# Patient Record
Sex: FEMALE | Race: WHITE
[De-identification: names, ages, dates, MRNs, and addresses within clinical notes are randomized per-mention and may not be internally consistent; named-entity substitution may affect disease eponyms.]

---

## 2019-03-07 ENCOUNTER — HOSPITAL ENCOUNTER (EMERGENCY)
Dept: HOSPITAL 61 - ER | Age: 49
Discharge: HOME | End: 2019-03-07
Payer: COMMERCIAL

## 2019-03-07 VITALS
DIASTOLIC BLOOD PRESSURE: 58 MMHG | DIASTOLIC BLOOD PRESSURE: 58 MMHG | SYSTOLIC BLOOD PRESSURE: 116 MMHG | SYSTOLIC BLOOD PRESSURE: 116 MMHG | SYSTOLIC BLOOD PRESSURE: 116 MMHG | DIASTOLIC BLOOD PRESSURE: 58 MMHG

## 2019-03-07 VITALS — BODY MASS INDEX: 45.99 KG/M2 | WEIGHT: 293 LBS | HEIGHT: 67 IN

## 2019-03-07 DIAGNOSIS — N93.8: ICD-10-CM

## 2019-03-07 DIAGNOSIS — D64.9: Primary | ICD-10-CM

## 2019-03-07 DIAGNOSIS — E86.0: ICD-10-CM

## 2019-03-07 DIAGNOSIS — Z88.8: ICD-10-CM

## 2019-03-07 DIAGNOSIS — J45.909: ICD-10-CM

## 2019-03-07 LAB
ANION GAP SERPL CALC-SCNC: 10 MMOL/L (ref 6–14)
BASOPHILS # BLD AUTO: 0.1 X10^3/UL (ref 0–0.2)
BASOPHILS NFR BLD: 1 % (ref 0–3)
BUN SERPL-MCNC: 16 MG/DL (ref 7–20)
CALCIUM SERPL-MCNC: 9.1 MG/DL (ref 8.5–10.1)
CHLORIDE SERPL-SCNC: 104 MMOL/L (ref 98–107)
CO2 SERPL-SCNC: 26 MMOL/L (ref 21–32)
CREAT SERPL-MCNC: 0.8 MG/DL (ref 0.6–1)
EOSINOPHIL NFR BLD: 0.2 X10^3/UL (ref 0–0.7)
EOSINOPHIL NFR BLD: 2 % (ref 0–3)
ERYTHROCYTE [DISTWIDTH] IN BLOOD BY AUTOMATED COUNT: 15.5 % (ref 11.5–14.5)
GFR SERPLBLD BASED ON 1.73 SQ M-ARVRAT: 75.9 ML/MIN
GLUCOSE SERPL-MCNC: 140 MG/DL (ref 70–99)
HCT VFR BLD CALC: 24.8 % (ref 36–47)
HGB BLD-MCNC: 7.5 G/DL (ref 12–15.5)
LYMPHOCYTES # BLD: 2.6 X10^3/UL (ref 1–4.8)
LYMPHOCYTES NFR BLD AUTO: 26 % (ref 24–48)
MCH RBC QN AUTO: 26 PG (ref 25–35)
MCHC RBC AUTO-ENTMCNC: 30 G/DL (ref 31–37)
MCV RBC AUTO: 84 FL (ref 79–100)
MONO #: 0.5 X10^3/UL (ref 0–1.1)
MONOCYTES NFR BLD: 5 % (ref 0–9)
NEUT #: 6.6 X10^3UL (ref 1.8–7.7)
NEUTROPHILS NFR BLD AUTO: 66 % (ref 31–73)
PLATELET # BLD AUTO: 356 X10^3/UL (ref 140–400)
POTASSIUM SERPL-SCNC: 3.7 MMOL/L (ref 3.5–5.1)
RBC # BLD AUTO: 2.94 X10^6/UL (ref 3.5–5.4)
SODIUM SERPL-SCNC: 140 MMOL/L (ref 136–145)
WBC # BLD AUTO: 9.9 X10^3/UL (ref 4–11)

## 2019-03-07 PROCEDURE — 36415 COLL VENOUS BLD VENIPUNCTURE: CPT

## 2019-03-07 PROCEDURE — 86850 RBC ANTIBODY SCREEN: CPT

## 2019-03-07 PROCEDURE — 86900 BLOOD TYPING SEROLOGIC ABO: CPT

## 2019-03-07 PROCEDURE — 99284 EMERGENCY DEPT VISIT MOD MDM: CPT

## 2019-03-07 PROCEDURE — 80048 BASIC METABOLIC PNL TOTAL CA: CPT

## 2019-03-07 PROCEDURE — 85025 COMPLETE CBC W/AUTO DIFF WBC: CPT

## 2019-03-07 PROCEDURE — 86901 BLOOD TYPING SEROLOGIC RH(D): CPT

## 2019-03-07 PROCEDURE — 96360 HYDRATION IV INFUSION INIT: CPT

## 2019-03-07 PROCEDURE — 81025 URINE PREGNANCY TEST: CPT

## 2019-03-07 NOTE — PHYS DOC
Past Medical History


Past Medical History:  Anemia, Asthma


Past Surgical History:  Cholecystectomy


Alcohol Use:  None


Drug Use:  None





Adult General


Chief Complaint


Chief Complaint:  DIZZY/LIGHT HEADED





HPI


HPI





Patient is a 49  year old female who presents the ER for evaluation. Patient 

with history of dysfunctional uterine bleeding with constantly for the past 60 

days. Reports established history of fibroids. Denies any abdominal pain. 

Patient has been following with her primary care doctor for this is the 

referred to an OB/GYN but has not seen the OB/GYN yet. Patient has had 

difficulty establishing with an OB/GYN secondary to insurance coverage. reports 

generalized fatigue, weakness and some intermittent orthostatic dizziness with 

standing. Has not required a blood transfusion in the past. Just started on 

iron supplements 3 days ago. Denies any GI bleed symptoms. Not On any blood 

thinners.





Review of Systems


Review of Systems





Constitutional: Denies fever or chills []





Respiratory: Denies cough or shortness of breath []


Cardiovascular: Chest pain, no palpitations, no orthopnea.


GI: Denies abdominal pain, nausea, vomiting, bloody stools or diarrhea []


: Denies dysuria or hematuria [].


Musculoskeletal: Denies back pain or joint pain []


Integument: Denies rash or skin lesions []


Neurologic: Denies headache, focal weakness or sensory changes. some mild 

intermittent dizziness []


Endocrine: Denies polyuria or polydipsia []





All other systems were reviewed and found to be within normal limits, except as 

documented in this note.





Current Medications


Current Medications





Current Medications








 Medications


  (Trade)  Dose


 Ordered  Sig/Travon  Start Time


 Stop Time Status Last Admin


Dose Admin


 


 Sodium Chloride  1,000 ml @ 


 1,000 mls/hr  1X  ONCE  3/7/19 14:15


 3/7/19 15:14 DC 3/7/19 14:15


1,000 MLS/HR











Allergies


Allergies





Allergies








Uncoded Allergies Type Severity Reaction Last Updated Verified


 


  horsradish Allergy Severe Anaphylaxis 4/9/14 











Physical Exam


Physical Exam





Constitutional: obese, non-toxic appearing


HENT: Normocephalic, atraumatic, 


Eyes: PERRLA, EOMI, 


Neck: Normal range of motion, no tenderness, supple, no stridor. [] 


Cardiovascular:Heart rate regular rhythm, no murmur []


Lungs & Thorax:  Bilateral breath sounds clear to auscultation []


Abdomen: Bowel sounds normal, soft, no tenderness, no masses, no pulsatile 

masses. [] 


Skin: Warm, dry, no erythema, no rash. [] 


Back: No tenderness, no CVA tenderness. [] 


Extremities: No tenderness,  no edema. [] 


Neurologic: Alert and oriented X 3, no focal deficits noted. []


Psychologic: Affect normal, judgement normal, mood normal. []





Current Patient Data


Vital Signs





 Vital Signs








  Date Time  Temp Pulse Resp B/P (MAP) Pulse Ox O2 Delivery O2 Flow Rate FiO2


 


3/7/19 14:30  94 23  100   


 


3/7/19 13:22 98.6   156/74 (101)  Room Air  





 98.6       








Lab Values





 Laboratory Tests








Test


 3/7/19


12:37 3/7/19


12:51


 


POC Urine HCG, Qualitative


 Hcg negative


(Negative) 





 


White Blood Count


 


 9.9 x10^3/uL


(4.0-11.0)


 


Red Blood Count


 


 2.94 x10^6/uL


(3.50-5.40)  L


 


Hemoglobin


 


 7.5 g/dL


(12.0-15.5)  L


 


Hematocrit


 


 24.8 %


(36.0-47.0)  L


 


Mean Corpuscular Volume


 


 84 fL ()





 


Mean Corpuscular Hemoglobin  26 pg (25-35)  


 


Mean Corpuscular Hemoglobin


Concent 


 30 g/dL


(31-37)  L


 


Red Cell Distribution Width


 


 15.5 %


(11.5-14.5)  H


 


Platelet Count


 


 356 x10^3/uL


(140-400)


 


Neutrophils (%) (Auto)  66 % (31-73)  


 


Lymphocytes (%) (Auto)  26 % (24-48)  


 


Monocytes (%) (Auto)  5 % (0-9)  


 


Eosinophils (%) (Auto)  2 % (0-3)  


 


Basophils (%) (Auto)  1 % (0-3)  


 


Neutrophils # (Auto)


 


 6.6 x10^3uL


(1.8-7.7)


 


Lymphocytes # (Auto)


 


 2.6 x10^3/uL


(1.0-4.8)


 


Monocytes # (Auto)


 


 0.5 x10^3/uL


(0.0-1.1)


 


Eosinophils # (Auto)


 


 0.2 x10^3/uL


(0.0-0.7)


 


Basophils # (Auto)


 


 0.1 x10^3/uL


(0.0-0.2)


 


Sodium Level


 


 140 mmol/L


(136-145)


 


Potassium Level


 


 3.7 mmol/L


(3.5-5.1)


 


Chloride Level


 


 104 mmol/L


()


 


Carbon Dioxide Level


 


 26 mmol/L


(21-32)


 


Anion Gap  10 (6-14)  


 


Blood Urea Nitrogen


 


 16 mg/dL


(7-20)


 


Creatinine


 


 0.8 mg/dL


(0.6-1.0)


 


Estimated GFR


(Cockcroft-Gault) 


 75.9  





 


Glucose Level


 


 140 mg/dL


(70-99)  H


 


Calcium Level


 


 9.1 mg/dL


(8.5-10.1)





 Laboratory Tests


3/7/19 12:51








 Laboratory Tests


3/7/19 12:51














EKG


EKG


[]





Radiology/Procedures


Radiology/Procedures


[]





Course & Med Decision Making


Course & Med Decision Making


Pertinent Labs and Imaging studies reviewed. (See chart for details)





1601: Hg 7.5. Patient somewhat orthostatic, patient given 1 L fluid with 

resolution of orthostatics. Patient feeling better. Patient discussed about 

borderline hemoglobin. Discussed the patient does not indicate transfusion at 

this time but could in the near future. Advised very close follow-up with her 

primary care doctor. Advised her to discuss her hemoglobin level with her 

primary care doctor. Advised her that she would need a repeat hemoglobin check 

in 3-5 days. Very strict ER return precautions given. Patient verbalized 

understanding. All questions answered.





Dragon Disclaimer


Dragon Disclaimer


This electronic medical record was generated, in whole or in part, using a 

voice recognition dictation system.





Departure


Departure


Impression:  


 Primary Impression:  


 Anemia


 Additional Impressions:  


 Dehydration


 Dysfunctional uterine bleeding


Disposition:  01 HOME, SELF-CARE


Condition:  IMPROVED


Referrals:  


JATINDER JACOBSON MD (PCP)


Patient Instructions:  Anemia, Nonspecific-Brief, Uterine Bleeding, 

Dysfunctional, Easy-to-Read





Additional Instructions:  


Thank you for coming to [facility]. Please read the attached 

handouts. Please follow-up with your primary care physician. Return to the ER  

if your symptoms worsen or you have any other concerns. Please drink plenty 

fluids to stay hydrated. She needed a repeat hemoglobin check on Monday. Your 

level was 7.5 today. Please contact your physician to expedite your referrals 

to OB/GYN.





Problem Qualifiers











VENKAT IRVING DO Mar 7, 2019 15:41

## 2019-03-25 ENCOUNTER — HOSPITAL ENCOUNTER (OUTPATIENT)
Dept: HOSPITAL 61 - KCIC US | Age: 49
Discharge: HOME | End: 2019-03-25
Attending: OBSTETRICS & GYNECOLOGY
Payer: COMMERCIAL

## 2019-03-25 DIAGNOSIS — N88.8: Primary | ICD-10-CM

## 2019-03-25 PROCEDURE — 76856 US EXAM PELVIC COMPLETE: CPT

## 2019-03-25 PROCEDURE — 76830 TRANSVAGINAL US NON-OB: CPT

## 2019-03-25 NOTE — KCIC
EXAM: Pelvic sonogram.

 

HISTORY: Menorrhagia.

 

TECHNIQUE: Sonographic imaging of the pelvis was performed.

 

COMPARISON: None.

 

FINDINGS: The uterus measures 11.6 x 5.3 x 6.6 cm. There is a suspected 

subseptate uterus. The endometrial stripe measures 11.4 mm within the 

right aspect of the endometrial cavity and 14.9 mm within the left aspect 

of the endometrial cavity.

 

The ovaries are not seen, likely obscured due to patient body habitus and 

bowel gas. There is no free fluid. There is an intrauterine fibroid 

measuring 2.1 cm in maximum dimension. There is a nabothian cyst within 

the cervix.

 

IMPRESSION: 

1. Suspected small uterine fibroid.

2. Suspected subseptate uterus. The endometrial stripe is within normal 

limits for the reported premenopausal status of the patient.

2. Obscured ovaries.

 

Electronically signed by: Shweta Guillaume MD (3/25/2019 3:58 PM) [id number]

## 2019-04-19 ENCOUNTER — HOSPITAL ENCOUNTER (OUTPATIENT)
Dept: HOSPITAL 61 - SURGPAT | Age: 49
Discharge: HOME | End: 2019-04-19
Attending: OBSTETRICS & GYNECOLOGY
Payer: COMMERCIAL

## 2019-04-19 DIAGNOSIS — N81.4: ICD-10-CM

## 2019-04-19 DIAGNOSIS — Z01.818: Primary | ICD-10-CM

## 2019-04-19 LAB
BASOPHILS # BLD AUTO: 0.1 X10^3/UL (ref 0–0.2)
BASOPHILS NFR BLD: 1 % (ref 0–3)
EOSINOPHIL NFR BLD: 0.4 X10^3/UL (ref 0–0.7)
EOSINOPHIL NFR BLD: 4 % (ref 0–3)
ERYTHROCYTE [DISTWIDTH] IN BLOOD BY AUTOMATED COUNT: 15.2 % (ref 11.5–14.5)
HCT VFR BLD CALC: 31.7 % (ref 36–47)
HGB BLD-MCNC: 9.9 G/DL (ref 12–15.5)
LYMPHOCYTES # BLD: 2.4 X10^3/UL (ref 1–4.8)
LYMPHOCYTES NFR BLD AUTO: 27 % (ref 24–48)
MCH RBC QN AUTO: 25 PG (ref 25–35)
MCHC RBC AUTO-ENTMCNC: 31 G/DL (ref 31–37)
MCV RBC AUTO: 80 FL (ref 79–100)
MONO #: 0.4 X10^3/UL (ref 0–1.1)
MONOCYTES NFR BLD: 5 % (ref 0–9)
NEUT #: 5.8 X10^3UL (ref 1.8–7.7)
NEUTROPHILS NFR BLD AUTO: 64 % (ref 31–73)
PLATELET # BLD AUTO: 264 X10^3/UL (ref 140–400)
RBC # BLD AUTO: 3.96 X10^6/UL (ref 3.5–5.4)
WBC # BLD AUTO: 9.1 X10^3/UL (ref 4–11)

## 2019-04-19 PROCEDURE — 36415 COLL VENOUS BLD VENIPUNCTURE: CPT

## 2019-04-19 PROCEDURE — 85025 COMPLETE CBC W/AUTO DIFF WBC: CPT

## 2019-04-19 PROCEDURE — 93005 ELECTROCARDIOGRAM TRACING: CPT

## 2019-04-19 NOTE — EKG
[facility]

              [address] [place] [address]

Test Date:    2019               Test Time:    12:59:20

Pat Name:     JASMINA MCKEE             Department:   

Patient ID:   [id number]           Room:          

Gender:       F                        Technician:   EVERARDO

:          1970               Requested By: CECILIA AMBRIZ

Order Number: [id number]           Reading MD:   Yong Carlson

                                 Measurements

Intervals                              Axis          

Rate:         79                       P:            42

PA:           150                      QRS:          0

QRSD:         84                       T:            36

QT:           370                                    

QTc:          425                                    

                           Interpretive Statements

SINUS RHYTHM

LEFTWARD AXIS





Electronically Signed On 2019 8:15:54 CDT by Yong Carlson

## 2019-04-25 ENCOUNTER — HOSPITAL ENCOUNTER (OUTPATIENT)
Dept: HOSPITAL 61 - SURG | Age: 49
Setting detail: OBSERVATION
LOS: 1 days | Discharge: HOME | End: 2019-04-26
Attending: OBSTETRICS & GYNECOLOGY | Admitting: OBSTETRICS & GYNECOLOGY
Payer: COMMERCIAL

## 2019-04-25 VITALS — SYSTOLIC BLOOD PRESSURE: 128 MMHG | DIASTOLIC BLOOD PRESSURE: 71 MMHG

## 2019-04-25 VITALS — SYSTOLIC BLOOD PRESSURE: 130 MMHG | DIASTOLIC BLOOD PRESSURE: 73 MMHG

## 2019-04-25 VITALS — DIASTOLIC BLOOD PRESSURE: 74 MMHG | SYSTOLIC BLOOD PRESSURE: 127 MMHG

## 2019-04-25 VITALS — BODY MASS INDEX: 45.99 KG/M2 | HEIGHT: 67 IN | WEIGHT: 293 LBS

## 2019-04-25 VITALS — DIASTOLIC BLOOD PRESSURE: 72 MMHG | SYSTOLIC BLOOD PRESSURE: 128 MMHG

## 2019-04-25 VITALS — DIASTOLIC BLOOD PRESSURE: 73 MMHG | SYSTOLIC BLOOD PRESSURE: 128 MMHG

## 2019-04-25 VITALS — DIASTOLIC BLOOD PRESSURE: 75 MMHG | SYSTOLIC BLOOD PRESSURE: 134 MMHG

## 2019-04-25 DIAGNOSIS — E11.9: ICD-10-CM

## 2019-04-25 DIAGNOSIS — E78.5: ICD-10-CM

## 2019-04-25 DIAGNOSIS — N93.9: Primary | ICD-10-CM

## 2019-04-25 DIAGNOSIS — N39.3: ICD-10-CM

## 2019-04-25 DIAGNOSIS — N81.2: ICD-10-CM

## 2019-04-25 DIAGNOSIS — I10: ICD-10-CM

## 2019-04-25 DIAGNOSIS — R32: ICD-10-CM

## 2019-04-25 DIAGNOSIS — E66.9: ICD-10-CM

## 2019-04-25 DIAGNOSIS — Z98.890: ICD-10-CM

## 2019-04-25 LAB — U PREG PATIENT: NEGATIVE

## 2019-04-25 PROCEDURE — 36415 COLL VENOUS BLD VENIPUNCTURE: CPT

## 2019-04-25 PROCEDURE — C1771 REP DEV, URINARY, W/SLING: HCPCS

## 2019-04-25 PROCEDURE — 57288 REPAIR BLADDER DEFECT: CPT

## 2019-04-25 PROCEDURE — 58262 VAG HYST INCLUDING T/O: CPT

## 2019-04-25 PROCEDURE — 86900 BLOOD TYPING SEROLOGIC ABO: CPT

## 2019-04-25 PROCEDURE — 86850 RBC ANTIBODY SCREEN: CPT

## 2019-04-25 PROCEDURE — G0379 DIRECT REFER HOSPITAL OBSERV: HCPCS

## 2019-04-25 PROCEDURE — G0378 HOSPITAL OBSERVATION PER HR: HCPCS

## 2019-04-25 PROCEDURE — 86901 BLOOD TYPING SEROLOGIC RH(D): CPT

## 2019-04-25 PROCEDURE — 82962 GLUCOSE BLOOD TEST: CPT

## 2019-04-25 PROCEDURE — 88307 TISSUE EXAM BY PATHOLOGIST: CPT

## 2019-04-25 PROCEDURE — 74018 RADEX ABDOMEN 1 VIEW: CPT

## 2019-04-25 PROCEDURE — 57260 CMBN ANT PST COLPRHY: CPT

## 2019-04-25 PROCEDURE — 81025 URINE PREGNANCY TEST: CPT

## 2019-04-25 PROCEDURE — 85025 COMPLETE CBC W/AUTO DIFF WBC: CPT

## 2019-04-25 PROCEDURE — A7015 AEROSOL MASK USED W NEBULIZE: HCPCS

## 2019-04-25 RX ADMIN — FENTANYL CITRATE PRN MCG: 50 INJECTION INTRAMUSCULAR; INTRAVENOUS at 11:55

## 2019-04-25 RX ADMIN — GABAPENTIN SCH MG: 300 CAPSULE ORAL at 13:36

## 2019-04-25 RX ADMIN — OXYCODONE HYDROCHLORIDE AND ACETAMINOPHEN PRN TAB: 5; 325 TABLET ORAL at 19:35

## 2019-04-25 RX ADMIN — GABAPENTIN SCH MG: 300 CAPSULE ORAL at 22:48

## 2019-04-25 RX ADMIN — FENTANYL CITRATE PRN MCG: 50 INJECTION INTRAMUSCULAR; INTRAVENOUS at 11:41

## 2019-04-25 RX ADMIN — OXYCODONE HYDROCHLORIDE AND ACETAMINOPHEN PRN TAB: 5; 325 TABLET ORAL at 13:36

## 2019-04-25 NOTE — OP
DATE OF SURGERY:  04/25/2019



PREOPERATIVE DIAGNOSES:

1.  Abnormal uterine bleeding.

2.  Incomplete uterovaginal prolapse.

3.  Stress urinary incontinence.



POSTOPERATIVE DIAGNOSES:

1.  Abnormal uterine bleeding.

2.  Incomplete uterovaginal prolapse.

3.  Stress urinary incontinence.



PROCEDURE:

1.  TVH BSO.

2.  Bladder sling.

3.  Anterior and posterior colporrhaphy.



SURGEON:  Jeremías Daniels MD.



ANESTHESIA:  GETA.



ESTIMATED BLOOD LOSS:  50 mL.



COMPLICATIONS:  None.



FINDINGS:  Incomplete uterovaginal prolapse and stress urinary incontinence.



SUMMARY:  A 49-year-old female with abnormal uterine bleeding, incomplete

uterovaginal prolapse as well as stress urinary incontinence required surgical

treatment.  The patient was counseled on risks, benefits and expectations and

voiced clear understanding to proceed.



DESCRIPTION OF PROCEDURE:  The patient was taken to surgery suite and placed in

dorsal lithotomy position.  She was prepped with Betadine solution for vaginal

prep and draped in a sterile fashion.  After adequate anesthesia, weighted

speculum and curved Kaila placed vaginally.  Anterior and posterior lip of the

cervix grasped with Kurt clamps.  1% lidocaine with epinephrine was injected in

a circumferential manner.  Bovie cautery was performed in a circumferential

manner.  The vaginal mucosa was then dissected away from the lower uterine

segment using a moist Ray-Babita.  Parametrial tissue was clamped bilaterally with

curved Herminio clamps, cut and suture ligated with 2-0 Vicryl suture.  The

posterior cul-de-sac was then entered sharply with curved Noyola scissors. 

Uterosacral ligaments were clamped bilaterally, cut, and suture ligated along

with the cardinal ligaments.  Two additional pedicles were taken just adjacent

to the uterus, which were clamped with curved Herminio clamps, cut and suture

ligated with 2-0 Vicryl suture.  Anterior cul-de-sac was entered sharply with

Metzenbaum scissors.  The uterus was then retroverted.  The round ligament and

uteroovarian pedicles were clamped bilaterally, cut, and suture ligated.  The

uterus was removed also using a coring method with the scalpel.  In order to

remove the uterus and cervix, the left fallopian tube and ovary were visualized,

grasped with Babcocks.  The left infundibulopelvic ligament was then isolated

and clamped with curved Herminio clamp.  The left fallopian tube and ovary were

then removed.  The pedicle was tied with 2-0 Vicryl suture.  Same process took

place with the right adnexa.  A modified Baxter's culdoplasty was performed

incorporating the uterosacral ligaments bilaterally.  The remainder of the

vaginal cuff was reapproximated using 2-0 Vicryl suture in a figure-of-eight

manner.



Allis clamp was placed 1 cm below the urethral orifice.  Second Allis clamp was

placed 4 cm below the first Allis clamp on the anterior vaginal wall mucosa.  1%

lidocaine with epinephrine was injected between the 2 Allis clamps in a linear

fashion as well as in the periurethral space.  1% lidocaine with epinephrine was

also injected in the groin region where the insertion of the trocars for the

Obtryx would be placed.  Scalpel was utilized to make an incision at the

insertion points in the groin as well as between the 2 Allis clamps in a linear

fashion.  With aid of Metzenbaum scissors, the vaginal mucosa was dissected away

from the pubovesical fascia as well as developing the periurethral space all the

way to the obturator foramen, which was dissected sharply along with blunt

dissection with my index finger.  The right trocar was then placed through the

groin incision and passed through the obturator foramen and guided with the

index finger.  The insertion point was at the level of the clitoris where the

abductus longus attached to pubic ramus bilaterally.  Same process took place

with the left trocar.  Cystoscopy was performed in which the bladder was normal

in appearance.  No evidence of perforation or injury.  Also visualized with the

cystoscope was the uterovesical junctions were functioning normally.  The

cystoscope was then removed.  The bladder sling was then adjusted to a loose fit

using 7 Hegar dilator.  The excess mesh at the skin level was excised with

suture scissors and the skin incision was reapproximated using Dermabond.



We then proceeded with anterior colporrhaphy in which the pubovesical fascia was

reapproximated using 2-0 Vicryl suture in an interrupted fashion.  The excess

anterior vaginal wall mucosa was removed with Metzenbaum scissors.  The

remaining anterior vaginal wall mucosa was reapproximated using 2-0 Vicryl

suture in figure-of-eight manner.



A finger was placed rectally to re-identify the rectocele.  A long Allis clamp

was placed about 3 cm into the vaginal vault on the posterior vaginal wall

mucosa.  Two Allis clamps were placed on the posterior fourchette in which 1%

lidocaine with epinephrine was injected between the 2 Allis clamps as well as

the posterior vaginal wall mucosa up to the Allis clamp 3 cm into the vaginal

vault.  Scalpel was utilized to make a transverse incision between the 2 Allis

clamps on the posterior fourchette.  The Metzenbaum scissors were used to

undermine the posterior vaginal wall mucosa.  This incision was then incised at

the midline.  The posterior vaginal wall mucosa was dissected away from the

rectovaginal fascia.  The rectovaginal fascia was then reapproximated using 2-0

Vicryl suture in an interrupted fashion.  The excess posterior vaginal wall

mucosa was excised using Metzenbaum scissors.  The remaining posterior vaginal

wall mucosa was reapproximated using 2-0 Vicryl suture in figure-of-eight

manner.  Premarin soaked vaginal packing was then placed.  A Barboza catheter was

also placed.  The patient tolerated the procedure well and was taken to recovery

room in stable condition.  The sponge and needle count were correct x 3. 

Instrument count was not performed prior to surgery; therefore, x-ray was

performed, which was negative.

 



______________________________

JEREMÍAS DANIELS MD



DR:  YUNG/keren  JOB#:  [id number] / [id number]

DD:  04/25/2019 11:32  DT:  04/25/2019 12:11

## 2019-04-25 NOTE — RAD
AP view of the abdomen

 

Clinical indications: Postop study after hysterectomy. Counts correct.

 

FINDINGS/

IMPRESSION: No metallic or radiopaque foreign body is evident. No 

postoperative functional ileus or bowel obstruction is evident.

 

Electronically signed by: Kenrick Weinberg MD (4/25/2019 5:38 PM) [place] [id number]

## 2019-04-25 NOTE — PDOC
BRIEF OPERATIVE NOTE


Date:  Apr 25, 2019


Pre-Op Diagnosis


1. AUB


2. Incomplete Uterovaginal Prolapse


3. ODETTE


Post-Op Diagnosis


Same


Procedure Performed


TVH, BSO


Bladder Sling


Anterior and Posterior Colporrhaphy


Surgeon


Dr. Daniels


Anesthesia Type:  General


Blood Loss


50 ml


Specimens Obtained


uterus, cervix, gentry. fallopian tubes and ovaries


Findings


Incomplete Uterovaginal prolapse and ODETTE


Complications


none


Operative Note


See dictation











JEREMÍAS DANIELS Jr, MD          Apr 25, 2019 11:32

## 2019-04-26 VITALS — DIASTOLIC BLOOD PRESSURE: 83 MMHG | SYSTOLIC BLOOD PRESSURE: 148 MMHG

## 2019-04-26 VITALS
SYSTOLIC BLOOD PRESSURE: 130 MMHG | DIASTOLIC BLOOD PRESSURE: 63 MMHG | DIASTOLIC BLOOD PRESSURE: 63 MMHG | SYSTOLIC BLOOD PRESSURE: 130 MMHG

## 2019-04-26 VITALS — SYSTOLIC BLOOD PRESSURE: 136 MMHG | DIASTOLIC BLOOD PRESSURE: 78 MMHG

## 2019-04-26 VITALS — DIASTOLIC BLOOD PRESSURE: 63 MMHG | SYSTOLIC BLOOD PRESSURE: 130 MMHG

## 2019-04-26 LAB
BASOPHILS # BLD AUTO: 0 X10^3/UL (ref 0–0.2)
BASOPHILS NFR BLD: 0 % (ref 0–3)
EOSINOPHIL NFR BLD: 0 % (ref 0–3)
EOSINOPHIL NFR BLD: 0 X10^3/UL (ref 0–0.7)
ERYTHROCYTE [DISTWIDTH] IN BLOOD BY AUTOMATED COUNT: 16.2 % (ref 11.5–14.5)
HCT VFR BLD CALC: 27.3 % (ref 36–47)
HGB BLD-MCNC: 8.9 G/DL (ref 12–15.5)
LYMPHOCYTES # BLD: 1.7 X10^3/UL (ref 1–4.8)
LYMPHOCYTES NFR BLD AUTO: 19 % (ref 24–48)
MCH RBC QN AUTO: 26 PG (ref 25–35)
MCHC RBC AUTO-ENTMCNC: 33 G/DL (ref 31–37)
MCV RBC AUTO: 80 FL (ref 79–100)
MONO #: 0.8 X10^3/UL (ref 0–1.1)
MONOCYTES NFR BLD: 9 % (ref 0–9)
NEUT #: 6.4 X10^3UL (ref 1.8–7.7)
NEUTROPHILS NFR BLD AUTO: 72 % (ref 31–73)
PLATELET # BLD AUTO: 234 X10^3/UL (ref 140–400)
RBC # BLD AUTO: 3.4 X10^6/UL (ref 3.5–5.4)
WBC # BLD AUTO: 8.9 X10^3/UL (ref 4–11)

## 2019-04-26 RX ADMIN — GABAPENTIN SCH MG: 300 CAPSULE ORAL at 06:00

## 2019-04-26 RX ADMIN — OXYCODONE HYDROCHLORIDE AND ACETAMINOPHEN PRN TAB: 5; 325 TABLET ORAL at 06:01

## 2019-04-26 RX ADMIN — GABAPENTIN SCH MG: 300 CAPSULE ORAL at 19:00

## 2019-04-26 RX ADMIN — OXYCODONE HYDROCHLORIDE AND ACETAMINOPHEN PRN TAB: 5; 325 TABLET ORAL at 19:01

## 2019-04-26 NOTE — DISCH
DISCHARGE INSTRUCTIONS


Condition on Discharge


Condition on Discharge:  Stable





Activity After Discharge


Activity Instructions for Disc:  Activity as tolerated, Avoid exertion


Bathing Instructions:  Shower-keep dressing dry


Lifting Instructions after Dis:  No heavy lifting, No pulling or pushing, Do not

lift >10 pounds


Driving Instructions after Dis:  No driving for 2 weeks


Sexual Activity Restrictions:  nothing in vagina for 6 weeks





Diet after Discharge


Diet after Discharge:  Regular





Wound Incision Care


Wound/Incision Care:  Ice to area for comfort





Contacting the DRAlissa after DC


Call your doctor for:  If your condition worsens





Follow-Up


Follow Up With:  Dr Daniels in 2 weeks











JEREMÍAS DANIELS Jr, MD          Apr 26, 2019 13:07

## 2019-04-26 NOTE — NUR
1730 bladder challenge repeated with 300cc of NS pt only able to void 100 cc

pan cath replaced and discharge instructions given pt verbalized understanding

## 2019-04-26 NOTE — PDOC
SURGICAL PROGRESS NOTE


Subjective


Pt. stable and pain controlled.  No complaints.


Vital Signs





Vital Signs








  Date Time  Temp Pulse Resp B/P (MAP) Pulse Ox O2 Delivery O2 Flow Rate FiO2


 


4/26/19 06:34 98.5 93 16 148/83 (104) 98 Room Air  





 98.5       


 


4/25/19 13:40       8.0 








I&O











Intake and Output 


 


 4/26/19





 06:59


 


Intake Total 5660 ml


 


Output Total 2275 ml


 


Balance 3385 ml


 


 


 


Intake Oral 1810 ml


 


IV Total 1600 ml


 


Other 2250 ml


 


Output Urine Total 2225 ml


 


Estimated Blood Loss 50 ml








PATIENT HAS A PAN:  No


General:  Alert, Oriented X3, Cooperative


HEENT:  Atraumatic


Lungs:  Clear to auscultation


Heart:  Regular rate


Abdomen:  Normal bowel sounds, Soft, No tenderness, No masses


Psych/Mental Status:  Mental status NL


Labs





Laboratory Tests








Test


 4/25/19


07:50 4/25/19


08:09 4/25/19


11:43 4/26/19


05:10


 


Urine Pregnancy Test Negative (NEG)    


 


Glucose (Fingerstick)


 


 94 mg/dL


(70-99) 120 mg/dL


(70-99) 





 


White Blood Count


 


 


 


 8.9 x10^3/uL


(4.0-11.0)


 


Red Blood Count


 


 


 


 3.40 x10^6/uL


(3.50-5.40)


 


Hemoglobin


 


 


 


 8.9 g/dL


(12.0-15.5)


 


Hematocrit


 


 


 


 27.3 %


(36.0-47.0)


 


Mean Corpuscular Volume    80 fL () 


 


Mean Corpuscular Hemoglobin    26 pg (25-35) 


 


Mean Corpuscular Hemoglobin


Concent 


 


 


 33 g/dL


(31-37)


 


Red Cell Distribution Width


 


 


 


 16.2 %


(11.5-14.5)


 


Platelet Count


 


 


 


 234 x10^3/uL


(140-400)


 


Neutrophils (%) (Auto)    72 % (31-73) 


 


Lymphocytes (%) (Auto)    19 % (24-48) 


 


Monocytes (%) (Auto)    9 % (0-9) 


 


Eosinophils (%) (Auto)    0 % (0-3) 


 


Basophils (%) (Auto)    0 % (0-3) 


 


Neutrophils # (Auto)


 


 


 


 6.4 x10^3uL


(1.8-7.7)


 


Lymphocytes # (Auto)


 


 


 


 1.7 x10^3/uL


(1.0-4.8)


 


Monocytes # (Auto)


 


 


 


 0.8 x10^3/uL


(0.0-1.1)


 


Eosinophils # (Auto)


 


 


 


 0.0 x10^3/uL


(0.0-0.7)


 


Basophils # (Auto)


 


 


 


 0.0 x10^3/uL


(0.0-0.2)








Laboratory Tests








Test


 4/26/19


05:10


 


White Blood Count


 8.9 x10^3/uL


(4.0-11.0)


 


Red Blood Count


 3.40 x10^6/uL


(3.50-5.40)


 


Hemoglobin


 8.9 g/dL


(12.0-15.5)


 


Hematocrit


 27.3 %


(36.0-47.0)


 


Mean Corpuscular Volume 80 fL () 


 


Mean Corpuscular Hemoglobin 26 pg (25-35) 


 


Mean Corpuscular Hemoglobin


Concent 33 g/dL


(31-37)


 


Red Cell Distribution Width


 16.2 %


(11.5-14.5)


 


Platelet Count


 234 x10^3/uL


(140-400)


 


Neutrophils (%) (Auto) 72 % (31-73) 


 


Lymphocytes (%) (Auto) 19 % (24-48) 


 


Monocytes (%) (Auto) 9 % (0-9) 


 


Eosinophils (%) (Auto) 0 % (0-3) 


 


Basophils (%) (Auto) 0 % (0-3) 


 


Neutrophils # (Auto)


 6.4 x10^3uL


(1.8-7.7)


 


Lymphocytes # (Auto)


 1.7 x10^3/uL


(1.0-4.8)


 


Monocytes # (Auto)


 0.8 x10^3/uL


(0.0-1.1)


 


Eosinophils # (Auto)


 0.0 x10^3/uL


(0.0-0.7)


 


Basophils # (Auto)


 0.0 x10^3/uL


(0.0-0.2)








Assessment/Plan


A: POD#1 s/p TVH, BSO, A&P Repair and BLadder Sling


P: Will replace pan cath since large residual volume with bladder scanner.  

Will complete bladder challenge this evening.  D/c home. F/u in 2 weeks.











JEREMÍAS BLAKELY Jr, MD          Apr 26, 2019 12:56

## 2021-08-25 ENCOUNTER — HOSPITAL ENCOUNTER (OUTPATIENT)
Dept: HOSPITAL 61 - KCIC US | Age: 51
End: 2021-08-25
Attending: FAMILY MEDICINE
Payer: COMMERCIAL

## 2021-08-25 DIAGNOSIS — H53.453: Primary | ICD-10-CM

## 2021-08-25 DIAGNOSIS — R47.9: ICD-10-CM

## 2021-08-25 DIAGNOSIS — R41.0: ICD-10-CM

## 2021-08-25 PROCEDURE — 93880 EXTRACRANIAL BILAT STUDY: CPT

## 2021-08-25 NOTE — KCIC
EXAM: Carotid Doppler sonogram.



HISTORY: Confusion. Speech disturbance. Strokelike symptoms. Atherosclerosis.



TECHNIQUE: Gray scale and color Doppler sonographic evaluation of the neck with spectral waveform mary
lysis was performed and static images are submitted for review. 



FINDINGS: The peak systolic velocity within the right common carotid artery is 151 cm/sec. The peak s
ystolic velocity within the right internal carotid artery is 99 cm/sec and the end diastolic velocity
 within the right internal carotid artery is 44 cm/sec. The right ICA/CCA ratio is 0.66.



The peak systolic velocity within the left common carotid artery is 131 cm/sec. The peak systolic socorro
ocity within the left internal carotid artery is 88 cm/sec and the end diastolic velocity within the 
left internal carotid artery is 31 cm/sec. The left ICA/CCA ratio is 0.67.



There is normal antegrade flow within both vertebral arteries.



IMPRESSION: Doppler findings consistent with less than 50 percent stenosis involving the internal car
otid arteries.





PQRS Compliance Statement - Stenosis calculations for CT, MR and conventional angiography are based u
oumar measurement of the distal ICA diameter in accordance with the NASCET methodology.  Stenosis calcu
lations for carotid ultrasound studies are derived from validated velocity criteria which are known t
o correlate with the NASCET methodology.

 



Electronically signed by: Shweta Guillaume MD (8/25/2021 3:28 PM) [id number]

## 2023-11-20 NOTE — PATHOLOGY
[facility] Accession Number: [id number]

.                                                                01

Material submitted:                                        .

uterus - UTERUS WITH CERVIX AND BILATERAL FALLOPIAN TUBES AND OVARIES

.                                                                01

Clinical history:                                          .

AUB, stress urinary incont

.                                                                02

**********************************************************************

Diagnosis:

Segments of uterine corpus with attached cervix and separate detached

bilateral fallopian tubes and ovaries, vaginal hysterectomy with bilateral

salpingo-oophorectomy:

- Mild chronic cervicitis with focal squamous metaplasia.

- Nabothian cysts, cervix.

- Slightly disordered proliferative endometrium.

- Adenomyosis, uterine corpus, subbasal, focal.

- Leiomyoma, uterine corpus, intramural.

- Congestion of bilateral fallopian tubes.

- Focal subserosal endometriosis and paratubal cyst of fallopian tube,

side indeterminate.

- Focal cystic endometriosis of ovary, side indeterminate.

- Cystic follicles of ovary, bilateral,several.

(JPM:pit 04/29/2019)

[facility]/04/29/2019

**********************************************************************

.                                                                02

Comment:

There is no evidence of malignancy.

(JPM:pit 04/29/2019)

.                                                                02

Electronically signed:                                     .

Mynor Choe MD, Pathologist

NPI- [id number]

.                                                                01

Gross description:                                         .

The specimen is received in formalin, labeled "Dayna Ellis, uterus with

cervix, bilat fallopian tubes and ovaries" and consists of a partial

uterine corpus with attached cervix (9.6 x 7.5 x 5.4 cm) and separate

segment of uterine corpus (6.6 x 5.7 x 5.7 cm) weighing 234 g combined.

Received separately are 2 bilateral tubo-ovarian complexes with a suture

on one with no orientation provided.  The sutured complex weighs 9 g and

consists of a fimbriated fallopian tube (4.9 cm in length and 0.5 cm in

diameter attached to a cystic and partially disrupted ovary (2.9 x 2.3 x

1.9 cm).  The nonsutured complex weighs 6 g and consists of a fimbriated

fallopian tube (3.3 cm in length and 0.5 cm in diameter attached to a 2.5

x 2.2 x 1.1 cm ovary.  The uterine serosa is gray-tan, smooth, and

glistening.  The 0.6 cm oval cervical os is lined by glistening pink-tan

ectocervical mucosa.  Both segments of uterine corpus/cervix are bivalved

to reveal a smooth and corrugated endocervical canal measuring 3.0 cm in

length.  The identifiable endometrium is pink-tan measuring 0.1 cm.  The

cervix reveals nabothian cysts of both the anterior and posterior aspects.

The myometrium is pink-tan and trabeculated with a single intramural

nodule measuring 1.1 x 1.0 cm.  The cut surfaces of the nodule are

homogeneous white and whorled without hemorrhage, necrosis, or

calcifications.  No additional masses or lesions are identified

.

Both the fimbriated fallopian tubes are gray-tan, smooth, and shiny with

sectioning reveals each to have a well-defined central lumen and no gross

lesions.  Sectioning the sutured tubo-ovarian complex ovary reveals

multiple uniloculated cysts with one containing blood clot and the others

clear fluid.  The cyst linings are smooth without papillary excrescences.

Sectioning the nonsutured ovary reveals a uniloculated cyst containing

hemorrhagic material.  Representative sections are submitted as follows:

.

A1: Anterior cervix

A2: Posterior cervix

A3-A4: Opposing endomyometrium

A5: Nodule

A5-A6: Sutured tubo-ovarian complex

A7-A8: Non-sutured tubo-ovarian complex

(SDY; 4/26/2019)

SYU/SYU

.                                                                02

Pathologist provided ICD-10:

N72, N80.0, D25.1, N80.2, N83.8

.                                                                02

CPT                                                        .

[id number]

Specimen Comment: A courtesy copy of this report has been sent to

Specimen Comment: [phone].

Specimen Comment: Report sent to 

***Performed at:  01

   [address], [place], [place]  [id number]

   MD Sergio Guzman MD Phone:  [phone]

***Performed at:  02

   [facility]

   [address], [place]  [id number]

   MD Mynor Choe MD Phone:  [phone] Crescentic Advancement Flap Text: The defect edges were debeveled with a #15 scalpel blade. Given the location of the defect and the proximity to free margins a crescentic advancement flap was deemed most appropriate. Using a sterile surgical marker, the appropriate advancement flap was drawn incorporating the defect and placing the expected incisions within the relaxed skin tension lines where possible. The area thus outlined was incised deep to adipose tissue with a #15 scalpel blade. The skin margins were undermined to an appropriate distance in all directions utilizing iris scissors. Following this, the designed flap was advanced and carried over into the primary defect and sutured into place.